# Patient Record
Sex: MALE | Race: WHITE
[De-identification: names, ages, dates, MRNs, and addresses within clinical notes are randomized per-mention and may not be internally consistent; named-entity substitution may affect disease eponyms.]

---

## 2019-01-24 ENCOUNTER — HOSPITAL ENCOUNTER (OUTPATIENT)
Dept: HOSPITAL 76 - SDS | Age: 29
Discharge: HOME | End: 2019-01-24
Attending: ORTHOPAEDIC SURGERY
Payer: COMMERCIAL

## 2019-01-24 VITALS — DIASTOLIC BLOOD PRESSURE: 90 MMHG | SYSTOLIC BLOOD PRESSURE: 131 MMHG

## 2019-01-24 DIAGNOSIS — G56.21: Primary | ICD-10-CM

## 2019-01-24 PROCEDURE — 01S40ZZ REPOSITION ULNAR NERVE, OPEN APPROACH: ICD-10-PCS | Performed by: ORTHOPAEDIC SURGERY

## 2019-01-24 PROCEDURE — 64718 REVISE ULNAR NERVE AT ELBOW: CPT

## 2019-01-24 NOTE — ANESTHESIA
Pre-Anesthesia VS, & Labs





- Diagnosis





cubital tunnel syndrome





- Procedure





right ulnar nerve transposition





                                        





Height                           6 ft 2 in


Weight (kg)                      104.33 kg











- NPO


>8 hours





Home Medications and Allergies


Home Medications: 


Ambulatory Orders





Loratadine [Claritin] 10 mg PO DAILY 01/23/19 











                                        





Loratadine [Claritin] 10 mg PO DAILY 01/23/19 








Allergies/Adverse Reactions: 


                                    Allergies











Allergy/AdvReac Type Severity Reaction Status Date / Time


 


No Known Drug Allergies Allergy   Verified 01/23/19 14:50














Anes History & Medical History





- Anesthetic History


Anesthesia Complications: reports: No previous complications





- Medical History


Cardiovascular: reports: None


Pulmonary: reports: None


Gastrointestinal: reports: None


Urinary: reports: None


Musculoskeletal: reports: Other


Endocrine/Autoimmune: reports: None


Skin: reports: None





Exam


General: Alert


Dental: WNL


Mouth Opening: Greater than 4 Fingerbreadths


Mallampati classification: II


Thyromental Distance: greater than 6 cm


Respiratory: Lungs clear


Cardiovascular: Regular rate, Normal S1, Normal S2





Plan


Anesthesia Type: General


Consent for Procedure(s) Verified and Reviewed: Yes


Code Status: Attempt Resuscitation


ASA classification: 1-Healthy patient


Is this case an emergency?: No

## 2020-01-18 ENCOUNTER — HOSPITAL ENCOUNTER (EMERGENCY)
Dept: HOSPITAL 76 - ED | Age: 30
Discharge: HOME | End: 2020-01-18
Payer: COMMERCIAL

## 2020-01-18 VITALS — SYSTOLIC BLOOD PRESSURE: 138 MMHG | DIASTOLIC BLOOD PRESSURE: 101 MMHG
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
## 2020-01-18 DIAGNOSIS — S39.012A: Primary | ICD-10-CM

## 2020-01-18 PROCEDURE — 99283 EMERGENCY DEPT VISIT LOW MDM: CPT

## 2020-01-18 PROCEDURE — 96372 THER/PROPH/DIAG INJ SC/IM: CPT
